# Patient Record
(demographics unavailable — no encounter records)

---

## 2017-07-22 NOTE — UC
Throat Pain/Nasal Keegan HPI





- HPI Summary


HPI Summary: 





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 NO FEVER. NO ABDOMINAL PAIN. NO RASH. NO COUGH. 





- History of Current Complaint


Chief Complaint: UCRespiratory


Stated Complaint: SORE THROAT


Time Seen by Provider: 07/22/17 15:11


Hx Obtained From: Patient


Hx Last Menstrual Period: today


Onset/Duration: Gradual Onset, Lasting Weeks, Still Present


Severity: Moderate


Pain Intensity: 5


Pain Scale Used: 0-10 Numeric


Cough: None


Associated Signs & Symptoms: Positive: Hoarseness





- Epiglottits Risk Factors


Epiglottis Risk Factors: Negative





- Allergies/Home Medications


Allergies/Adverse Reactions: 


 Allergies











Allergy/AdvReac Type Severity Reaction Status Date / Time


 


No Known Allergies Allergy   Verified 07/22/17 15:09











Home Medications: 


 Home Medications





Phenylephrine-Diphenhydramine- [THERAFLU SEVERE COLD & CO Packet]  07/22/17 [

History Confirmed 07/22/17]











PMH/Surg Hx/FS Hx/Imm Hx


Previously Healthy: Yes





- Surgical History


Surgical History: None





- Family History


Known Family History: 


   Negative: Respiratory Disease





- Social History


Occupation: Employed Full-time


Lives: With Family


Alcohol Use: None


Substance Use Type: None


Smoking Status (MU): Never Smoked Tobacco





- Immunization History


Most Recent Influenza Vaccination: 10/15/15


Most Recent Tetanus Shot: 11/19/15


Most Recent Pneumonia Vaccination: none





Review of Systems


Constitutional: Negative


Skin: Negative


Eyes: Negative


ENT: Sore Throat


Respiratory: Negative


Cardiovascular: Negative


Gastrointestinal: Negative


Genitourinary: Negative


Motor: Negative


Neurovascular: Negative


Musculoskeletal: Negative


Neurological: Negative


Psychological: Negative


All Other Systems Reviewed And Are Negative: Yes





Physical Exam


Triage Information Reviewed: Yes


Appearance: Well-Appearing


Vital Signs: 


 Initial Vital Signs











Temp  98.0 F   07/22/17 15:04


 


Pulse  117   07/22/17 15:04


 


Resp  12   07/22/17 15:04


 


BP  117/76   07/22/17 15:04


 


Pulse Ox  100   07/22/17 15:04











Vital Signs Reviewed: Yes


Eye Exam: Normal


ENT: Positive: Pharyngeal erythema, TM dull, Tonsillar swelling


Dental Exam: Normal


Neck exam: Normal


Respiratory Exam: Normal


Respiratory: Positive: Chest non-tender, Lungs clear, Normal breath sounds, No 

respiratory distress, No accessory muscle use


Cardiovascular Exam: Normal


Cardiovascular: Positive: RRR, No Murmur, Pulses Normal, Brisk Capillary Refill


Abdominal Exam: Normal


Musculoskeletal Exam: Normal


Musculoskeletal: Positive: Strength Intact, ROM Intact


Neurological Exam: Normal


Psychological Exam: Normal


Skin Exam: Normal





Throat Pain/Nasal Course/Dx





- Differential Dx/Diagnosis


Differential Diagnosis/HQI/PQRI: Pharyngitis, Sinusitis, Tonsillitis, URI


Provider Diagnoses: STREP TONSILLITIS





Discharge





- Discharge Plan


Condition: Stable


Disposition: HOME


Prescriptions: 


Amoxicillin/Clavulanate TAB* [Augmentin *] 875 mg PO BID #20 tab


Patient Education Materials:  Strep Throat (ED)


Referrals: 


Radha Hall MD [Medical Doctor] -

## 2019-03-05 NOTE — UC
Respiratory Complaint HPI





- HPI Summary


HPI Summary: 





32 yo female presents with 4 days of a dry cough - worse at bedtime. Her son is 

sick with similar symptoms. She has been taking OTC cough medicine with little 

relief. No hx of asthma, but states her chest feels "tight" at times while 

coughing. Denies fever, chills, sinus symptoms, sore throat, SOB, chest pain. 





- History of Current Complaint


Stated Complaint: COUGH


Time Seen by Provider: 03/05/19 11:23


Hx Obtained From: Patient


Hx Last Menstrual Period: today


Onset/Duration: Sudden Onset


Timing: Constant


Character: Cough: Nonproductive





- Allergies/Home Medications


Allergies/Adverse Reactions: 


 Allergies











Allergy/AdvReac Type Severity Reaction Status Date / Time


 


No Known Allergies Allergy   Verified 03/05/19 11:31











Home Medications: 


 Home Medications





L.acidoph,Paracasei, B.lactis [Probiotic] 1 each PO DAILY 03/05/19 [History 

Confirmed 03/05/19]


Melatonin 5 mg PO BEDTIME 03/05/19 [History Confirmed 03/05/19]


Multivit-Min/Iron/Folic Acid/K [Multi For Her Softgel] 1 each PO DAILY 03/05/19 

[History Confirmed 03/05/19]


Norgestimate-Ethinyl Estradiol [Sprintec 28 0.25-35 mg-Mcg] 1 tab PO DAILY 03/05 /19 [History Confirmed 03/05/19]











PMH/Surg Hx/FS Hx/Imm Hx





- Additional Past Medical History


Additional PMH: 





None





- Surgical History


Surgical History: None





- Family History


Known Family History: 


   Negative: Respiratory Disease





- Social History


Occupation: Employed Full-time


Lives: With Family


Alcohol Use: None


Substance Use Type: None


Smoking Status (MU): Never Smoked Tobacco





- Immunization History


Most Recent Influenza Vaccination: 10/15/15


Most Recent Tetanus Shot: 11/19/15


Most Recent Pneumonia Vaccination: none





Review of Systems


All Other Systems Reviewed And Are Negative: Yes


Constitutional: Positive: Negative


Skin: Positive: Negative


Eyes: Positive: Negative


ENT: Positive: Negative


Respiratory: Positive: Cough


Cardiovascular: Positive: Negative


Gastrointestinal: Positive: Negative


Neurovascular: Positive: Negative


Neurological: Positive: Negative


Psychological: Positive: Negative





Physical Exam





- Summary


Physical Exam Summary: 





GENERAL: NAD. WDWN. No pain distress.


SKIN: No rashes, sores, lesions, or open wounds.


HEENT:


            Head: AT/NC


            Eyes:  EOM intact. Conjunctiva clear without inflammation or 

discharge.


            Ears: Hearing grossly normal. TMs intact, no bulging, erythema, or 

edema. 


            Nose: Nasal mucosa pink and moist. NTTP maxillary and frontal 

sinus. 


            Throat: Posterior oropharynx without exudates, erythema, or 

tonsillar enlargement.  Uvula midline.


NECK: Supple. Nontender. No lymphadenopathy. 


CHEST:  CTAB. No r/r/w. No accessory muscle use. Breathing comfortably and in 

no distress.


CV:  RRR. Without m/r/g. Pulses intact. Cap refill <2seconds


NEURO: Alert. 


PSYCH: Age appropriate behavior.


Triage Information Reviewed: Yes


Vital Signs: 





Vital Signs:











Temp Pulse Resp BP Pulse Ox


 


 97.8 F   72   18   121/67   100 


 


 03/05/19 11:33  03/05/19 11:33  03/05/19 11:33  03/05/19 11:33  03/05/19 11:33











Vital Signs Reviewed: Yes





Respiratory Course/Dx





- Course


Course Of Treatment: Suspect bronchitis. She was given a duoneb treatment in 

the clinic and felt it easier to take a deep breath. Discussed viral vs 

bacterial causes of her cough and she requests to have antibiotics called in at 

this time in case she doesn't improve with cough medicine and continued OTC 

remedies.





- Differential Dx/Diagnosis


Provider Diagnosis: 


 Bronchitis








Discharge





- Sign-Out/Discharge


Documenting (check all that apply): Patient Departure


All imaging exams completed and their final reports reviewed: No Studies





- Discharge Plan


Condition: Stable


Disposition: HOME


Prescriptions: 


Azithromycin TAB* [Zithromax TAB (Z-YAHIR) 250 mg #6 tabs] 2 tab PO .TODAY, THEN 

1 DAILY #1 yahir


Benzonatate CAP* [Tessalon 100 MG CAP*] 100 mg PO TID PRN #21 cap


 PRN Reason: Cough


Codeine Phosphate/Guaifenesin [Guaifen-Codeine 100-10 mg/5 ml] 5 ml PO BEDTIME 

PRN #35 ml MDD 5mL


 PRN Reason: Cough


Patient Education Materials:  Acute Bronchitis (ED)


Referrals: 


Radha Hall MD [Primary Care Provider] - 


Additional Instructions: 


If you develop a fever, shortness of breath, chest pain, new or worsening 

symptoms - please call your PCP or go to the ED.


 








- Billing Disposition and Condition


Condition: STABLE


Disposition: Home





- Attestation Statements


Provider Attestation: 





I was available for consult. This patient was seen by the AMANDA. The patient was 

not presented to, seen by, or examined by me. -Kelle

## 2019-04-29 NOTE — UC
FLU HPI





- HPI Summary


HPI Summary: 





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 

throat and fever up to 102.  She states that she has been exposed to both 

influenza and strep recently and her job.  She has had body aches and chills as 

well as sweats.  She denies shortness of breath or severe cough.  She has had 

nausea without vomiting or diarrhea.  She is able to tolerate fluids.  She 

denies other symptoms.





- History of Current Complaint


Chief Complaint: UCGeneralIllness


Stated Complaint: SORE THROAT


Time Seen by Provider: 04/29/19 09:17


Hx Obtained From: Patient


Hx Last Menstrual Period: 4/22/19


Pain Intensity: 8





- Allergy/Home Medications


Allergies/Adverse Reactions: 


 Allergies











Allergy/AdvReac Type Severity Reaction Status Date / Time


 


No Known Allergies Allergy   Verified 04/29/19 09:05














PMH/Surg Hx/FS Hx/Imm Hx


Previously Healthy: Yes





- Surgical History


Surgical History: None





- Family History


Known Family History: Positive: Non-Contributory


   Negative: Respiratory Disease





- Social History


Occupation: Employed Full-time


Alcohol Use: None


Substance Use Type: None


Smoking Status (MU): Never Smoked Tobacco





- Immunization History


Most Recent Influenza Vaccination: 10/15/15


Most Recent Tetanus Shot: 11/19/15


Most Recent Pneumonia Vaccination: none





Review of Systems


All Other Systems Reviewed And Are Negative: Yes


Constitutional: Positive: Fever, Chills


Skin: Positive: Negative


Eyes: Positive: Negative


ENT: Positive: Sore Throat, Nasal Discharge


Respiratory: Positive: Cough.  Negative: Shortness Of Breath


Cardiovascular: Positive: Negative


Gastrointestinal: Negative: Vomiting, Nausea





Physical Exam


Triage Information Reviewed: Yes


Appearance: Well-Nourished, Ill-Appearing


Vital Signs: 


 Initial Vital Signs











Temp  98.0 F   04/29/19 09:01


 


Pulse  86   04/29/19 09:01


 


Resp  18   04/29/19 09:01


 


BP  120/79   04/29/19 09:01


 


Pulse Ox  100   04/29/19 09:01











Vital Signs Reviewed: Yes


Eyes: Positive: Conjunctiva Clear


ENT: Positive: Pharyngeal erythema, Nasal congestion, Nasal drainage, TMs normal

, Tonsillar swelling.  Negative: Tonsillar exudate, Sinus tenderness


Neck: Positive: Supple, Nontender, No Lymphadenopathy


Respiratory: Positive: Lungs clear


Cardiovascular: Positive: RRR


Musculoskeletal Exam: Normal


Musculoskeletal: Positive: Strength Intact, ROM Intact


Neurological Exam: Normal


Psychological Exam: Normal





Flu Course/Dx





- Course


Course Of Treatment: 





 Lab Results











  04/29/19 04/29/19 Range/Units





  09:25 09:27 


 


Influenza A (Rapid)   Negative  (Negative)  


 


Influenza B (Rapid)   Negative  (Negative)  


 


Group A Strep Rapid  Positive A   (Negative)  








+ Strep. Give decadron and antibiotic. Off work. F/U PMD.





- Differential Dx/Diagnosis


Differential Diagnosis/HQI/PQRI: Bronchitis, Influenza, Upper Respiratory 

Infection, Other - Step/mono


Provider Diagnosis: 


 Strep pharyngitis








Discharge





- Sign-Out/Discharge


Documenting (check all that apply): Patient Departure


All imaging exams completed and their final reports reviewed: No Studies





- Discharge Plan


Condition: Improved


Disposition: HOME


Prescriptions: 


Amoxicillin PO (*) [Amoxicillin 875 MG (*)] 875 mg PO BID #20 tab


Dexamethasone TAB* [Decadron TAB*] 8 mg PO DAILY #6 tab


Patient Education Materials:  Strep Throat (ED)


Forms:  *Work Release


Referrals: 


Radha Hall MD [Primary Care Provider] - 


Additional Instructions: 


Drink plenty of fluids.  Tylenol, ibuprofen as needed for discomfort.  

Antibiotic and steroid were given.  Return if worse, unable to keep down fluids

, new symptoms or other concerns.  Follow-up with primary care physician as 

needed.





- Billing Disposition and Condition


Condition: IMPROVED


Disposition: Home